# Patient Record
Sex: MALE | Race: BLACK OR AFRICAN AMERICAN | Employment: OTHER | URBAN - METROPOLITAN AREA
[De-identification: names, ages, dates, MRNs, and addresses within clinical notes are randomized per-mention and may not be internally consistent; named-entity substitution may affect disease eponyms.]

---

## 2024-02-16 ENCOUNTER — APPOINTMENT (OUTPATIENT)
Facility: HOSPITAL | Age: 74
End: 2024-02-16
Payer: MEDICARE

## 2024-02-16 ENCOUNTER — HOSPITAL ENCOUNTER (EMERGENCY)
Facility: HOSPITAL | Age: 74
Discharge: HOME | End: 2024-02-16
Attending: EMERGENCY MEDICINE
Payer: MEDICARE

## 2024-02-16 VITALS
WEIGHT: 169 LBS | RESPIRATION RATE: 25 BRPM | HEIGHT: 68 IN | SYSTOLIC BLOOD PRESSURE: 80 MMHG | DIASTOLIC BLOOD PRESSURE: 52 MMHG | BODY MASS INDEX: 25.61 KG/M2 | OXYGEN SATURATION: 100 % | TEMPERATURE: 97.7 F

## 2024-02-16 DIAGNOSIS — M25.552 LEFT HIP PAIN: Primary | ICD-10-CM

## 2024-02-16 PROCEDURE — 72192 CT PELVIS W/O DYE: CPT

## 2024-02-16 PROCEDURE — 99284 EMERGENCY DEPT VISIT MOD MDM: CPT

## 2024-02-16 PROCEDURE — 6370000000 HC RX 637 (ALT 250 FOR IP): Performed by: EMERGENCY MEDICINE

## 2024-02-16 RX ORDER — MECLIZINE HYDROCHLORIDE 25 MG/1
25 TABLET ORAL ONCE
Status: DISCONTINUED | OUTPATIENT
Start: 2024-02-16 | End: 2024-02-16 | Stop reason: HOSPADM

## 2024-02-16 RX ORDER — HYDROCODONE BITARTRATE AND ACETAMINOPHEN 5; 325 MG/1; MG/1
1 TABLET ORAL EVERY 6 HOURS PRN
Qty: 11 TABLET | Refills: 0 | Status: SHIPPED | OUTPATIENT
Start: 2024-02-16 | End: 2024-02-19

## 2024-02-16 RX ORDER — OXYCODONE HYDROCHLORIDE 5 MG/1
5 TABLET ORAL
Status: COMPLETED | OUTPATIENT
Start: 2024-02-16 | End: 2024-02-16

## 2024-02-16 RX ORDER — DOCUSATE SODIUM 100 MG/1
CAPSULE, LIQUID FILLED ORAL
Qty: 20 CAPSULE | Refills: 0 | Status: SHIPPED | OUTPATIENT
Start: 2024-02-16

## 2024-02-16 RX ADMIN — OXYCODONE 5 MG: 5 TABLET ORAL at 14:40

## 2024-02-16 NOTE — ED TRIAGE NOTES
Patient with LVAD arrives c/o of pain in L hip for 1 month, taking tylenol without relief. Reports fall 1 month ago with intermittent pain since that time. Seen at ED at time of fall but hip did not hurt at the time and was not imaged.

## 2024-02-16 NOTE — ED PROVIDER NOTES
[facility] [place]  EMERGENCY DEPARTMENT ENCOUNTER      Pt Name: Scott De Anda  MRN: [id number]  Birthdate 1950  Date of evaluation: 2/16/2024  Provider: Estuardo Signletary MD      HISTORY OF PRESENT ILLNESS      73-year-old male with history of heart failure with current LVAD presents to the emergency department with chief complaint of left hip pain for least a month.  He fell about a month ago and was seen in the emergency department at [facility] where he had head imaging which was reassuring.  He did not have left hip pain at the time and therefore was not imaged there.  He complains of ongoing pain since that time.  Pain is the same whether he stands or not.  He has been ambulatory.  He took some Tylenol this morning.    The history is provided by the patient and medical records.           Nursing Notes were reviewed.    REVIEW OF SYSTEMS         Review of Systems        PAST MEDICAL HISTORY   No past medical history on file.      SURGICAL HISTORY     No past surgical history on file.      CURRENT MEDICATIONS       Previous Medications    No medications on file       ALLERGIES     Patient has no allergy information on record.    FAMILY HISTORY     No family history on file.       SOCIAL HISTORY       Social History     Socioeconomic History    Marital status:          PHYSICAL EXAM       ED Triage Vitals [02/16/24 1415]   BP Temp Temp Source Pulse Respirations SpO2 Height Weight - Scale   -- 97.7 °F (36.5 °C) Oral -- 25 100 % 1.727 m (5' 8\") 76.7 kg (169 lb)       Body mass index is 25.7 kg/m².    Physical Exam  Vitals and nursing note reviewed.   Constitutional:       Appearance: Normal appearance.   Musculoskeletal:         General: No swelling, tenderness or deformity. Normal range of motion.      Comments: Negative straight leg raise   Neurological:      Mental Status: He is alert.             EMERGENCY DEPARTMENT COURSE and DIFFERENTIAL DIAGNOSIS/MDM:   Vitals:    Vitals:    02/16/24 1415